# Patient Record
Sex: MALE | Race: WHITE | NOT HISPANIC OR LATINO | URBAN - METROPOLITAN AREA
[De-identification: names, ages, dates, MRNs, and addresses within clinical notes are randomized per-mention and may not be internally consistent; named-entity substitution may affect disease eponyms.]

---

## 2024-03-27 DIAGNOSIS — Z98.890 POSTOPERATIVE STATE: Primary | ICD-10-CM

## 2024-03-27 RX ORDER — OXYCODONE HYDROCHLORIDE 5 MG/1
5 TABLET ORAL EVERY 6 HOURS PRN
Qty: 12 TABLET | Refills: 0 | Status: SHIPPED | OUTPATIENT
Start: 2024-03-27 | End: 2024-03-30

## 2024-03-28 ENCOUNTER — TRANSFERRED RECORDS (OUTPATIENT)
Dept: HEALTH INFORMATION MANAGEMENT | Facility: CLINIC | Age: 59
End: 2024-03-28

## 2024-04-03 ENCOUNTER — OFFICE VISIT (OUTPATIENT)
Dept: PLASTIC SURGERY | Facility: CLINIC | Age: 59
End: 2024-04-03
Payer: COMMERCIAL

## 2024-04-03 DIAGNOSIS — Z98.890 POSTOPERATIVE STATE: Primary | ICD-10-CM

## 2024-04-03 NOTE — PROGRESS NOTES
"Saw Jeremy one week s/p local tissue rearrangement to left medial malar cheek mohs defect (3/28/24).    Incision cleansed with hydrogen peroxide. Sutures removed from incision without difficulty.  Aquaphor applied. He reports oozing has slowed down in the last day. Incision is well approximated. Pt is expectedly bruised and edematous.    We discussed continued cares not limited to continued cleansing with 1:1 hydrogen peroxide and water, frequent application of Aquaphor, sun protection and the future use of silicone scar gel and gentle massage. He understands numbness is normal at this point and sensation will likely gradually return over the next weeks to months. The firmness he is feeling will also resolve with time as swelling dissipates.     He will begin nitro paste - instructed to apply a thin layer with a q-tip in the morning on medial \"purple\" area of flap, wipe off at night, continue this routine for one week. Discontinue use immediately for headache, dizziness, or similar symptoms.    All questions answered at this time. Pt instructed to reach out if questions or concerns arise.      Follow-up scheduled.    Shasta Clemons RN  4/3/2024 10:25 AM    "

## 2024-05-03 ENCOUNTER — TELEPHONE (OUTPATIENT)
Dept: OTOLARYNGOLOGY | Facility: CLINIC | Age: 59
End: 2024-05-03

## 2024-05-14 ENCOUNTER — OFFICE VISIT (OUTPATIENT)
Dept: PLASTIC SURGERY | Facility: CLINIC | Age: 59
End: 2024-05-14
Payer: COMMERCIAL

## 2024-05-14 DIAGNOSIS — Z98.890 POSTOPERATIVE STATE: Primary | ICD-10-CM

## 2024-05-14 NOTE — PROGRESS NOTES
Facial Plastic and Reconstructive Surgery      Jeremy Rahman is a 58 year old male who is s/p local tissue rearrangement to left malar cheek mohs defect (2 adjacent defects) with adjacent tissue rearrangement on 3/28/24. Dr. Nino was his mohs surgeon. I did do some nitropaste over the distal flap as it looked quite dusky at his one week visit. Today, he is doing great with no concerns. He is using the silicone scar gel.     On exam, the repair looks great. The incision is healing nicely. There is no flap loss.     A/P: Jeremy Rahman is now about 2 months s/p cervicofacial advancement flap for left cheek mohs defects. He is looking great. He should continue silicone scar gel and sun protection. I will see him back in 3-4 months, sooner if there are issues.     Liza Flores MD

## 2024-09-09 NOTE — PROGRESS NOTES
Facial Plastic and Reconstructive Surgery        Jeremy Rahman is a 58 year old male who is s/p local tissue rearrangement to left malar cheek mohs defect (2 adjacent defects) with adjacent tissue rearrangement on 3/28/24 (lentigo maligna melanoma). Dr. Nino was his mohs surgeon. I did do some nitropaste over the distal flap as it looked quite dusky at his one week visit. Today, he reports he is doing well. He has no concerns. He is being diligent with sunscreen.      On exam, the repair looks great. The incision is healing nicely. Incision lines are hard to see.      A/P: Jeremy Rahman is now about 5.5 months s/p cervicofacial advancement flap for left cheek mohs defects. He is looking great. He should continue silicone scar gel and sun protection. I will see him back in 3-4 months, sooner if there are issues.      Liza Flores MD

## 2024-09-10 ENCOUNTER — OFFICE VISIT (OUTPATIENT)
Dept: PLASTIC SURGERY | Facility: CLINIC | Age: 59
End: 2024-09-10
Payer: COMMERCIAL

## 2024-09-10 DIAGNOSIS — D03.9 LENTIGO MALIGNA (H): Primary | ICD-10-CM

## 2024-12-17 ENCOUNTER — OFFICE VISIT (OUTPATIENT)
Dept: PLASTIC SURGERY | Facility: CLINIC | Age: 59
End: 2024-12-17
Payer: COMMERCIAL

## 2024-12-17 DIAGNOSIS — D03.9 LENTIGO MALIGNA (H): Primary | ICD-10-CM

## 2024-12-17 NOTE — PROGRESS NOTES
Facial Plastic and Reconstructive Surgery        Jeremy Rahman is a 58 year old male who is s/p local tissue rearrangement to left malar cheek mohs defect (2 adjacent defects) with adjacent tissue rearrangement on 3/28/24 (lentigo maligna melanoma). Dr. Nino was his mohs surgeon. I did do some nitropaste over the distal flap as it looked quite dusky at his one week visit. Today, he reports he is doing well. He has no concerns. Is planning to do chemo cream in the next couple weeks to his face.      On exam, the repair looks great. The incision is healing nicely. Incision lines are hard to see.      A/P: Jeremy Rahman is now about 9 months s/p cervicofacial advancement flap for left cheek mohs defects. He is looking great. He should continue silicone scar gel and sun protection. I will see him back in 3-4 months, sooner if there are issues. We will do pictures at his next visit.      Liza Flores MD

## 2025-03-24 NOTE — PROGRESS NOTES
Facial Plastic and Reconstructive Surgery        Jeremy Rahman is a 58 year old male who is s/p local tissue rearrangement to left malar cheek mohs defect (2 adjacent defects) with adjacent tissue rearrangement on 3/28/24 (lentigo maligna melanoma). Dr. Nino was his mohs surgeon. I did do some nitropaste over the distal flap as it looked quite dusky at his one week visit. Today, he reports he is doing well.      On exam, the repair looks great. The incision is healing nicely. Incision lines are hard to see. There was a milia that I unroofed.      A/P: Jeremy Rahman is now about 12 months s/p cervicofacial advancement flap for left cheek mohs defects. He is looking great. We discussed that he can follow up as needed. If milia return I want to see him back and certainly if there are any other concerns. It has been a pleasure taking care of him.      Liza Flores MD

## 2025-03-25 ENCOUNTER — OFFICE VISIT (OUTPATIENT)
Dept: PLASTIC SURGERY | Facility: CLINIC | Age: 60
End: 2025-03-25
Payer: COMMERCIAL

## 2025-03-25 DIAGNOSIS — D03.9 LENTIGO MALIGNA (H): Primary | ICD-10-CM

## 2025-03-27 NOTE — NURSING NOTE
Photo documentation obtained.     Obtained photo consent to use before/after photos to help education current and future patients.     Fide Norwood RN  03/27/25 3:33 PM